# Patient Record
Sex: MALE | Race: BLACK OR AFRICAN AMERICAN
[De-identification: names, ages, dates, MRNs, and addresses within clinical notes are randomized per-mention and may not be internally consistent; named-entity substitution may affect disease eponyms.]

---

## 2017-03-24 ENCOUNTER — HOSPITAL ENCOUNTER (OUTPATIENT)
Dept: HOSPITAL 62 - RAD | Age: 62
End: 2017-03-24
Attending: INTERNAL MEDICINE
Payer: COMMERCIAL

## 2017-03-24 DIAGNOSIS — K74.69: Primary | ICD-10-CM

## 2017-03-24 PROCEDURE — 74177 CT ABD & PELVIS W/CONTRAST: CPT

## 2018-07-20 ENCOUNTER — HOSPITAL ENCOUNTER (EMERGENCY)
Dept: HOSPITAL 62 - ER | Age: 63
Discharge: HOME | End: 2018-07-20
Payer: COMMERCIAL

## 2018-07-20 VITALS — DIASTOLIC BLOOD PRESSURE: 70 MMHG | SYSTOLIC BLOOD PRESSURE: 150 MMHG

## 2018-07-20 DIAGNOSIS — I10: ICD-10-CM

## 2018-07-20 DIAGNOSIS — R06.02: Primary | ICD-10-CM

## 2018-07-20 DIAGNOSIS — D69.6: ICD-10-CM

## 2018-07-20 DIAGNOSIS — Z94.4: ICD-10-CM

## 2018-07-20 DIAGNOSIS — Z86.718: ICD-10-CM

## 2018-07-20 LAB
ADD MANUAL DIFF: NO
ANION GAP SERPL CALC-SCNC: 10 MMOL/L (ref 5–19)
BASOPHILS # BLD AUTO: 0 10^3/UL (ref 0–0.2)
BASOPHILS NFR BLD AUTO: 0.8 % (ref 0–2)
BUN SERPL-MCNC: 12 MG/DL (ref 7–20)
CALCIUM: 9.1 MG/DL (ref 8.4–10.2)
CHLORIDE SERPL-SCNC: 108 MMOL/L (ref 98–107)
CO2 SERPL-SCNC: 24 MMOL/L (ref 22–30)
EOSINOPHIL # BLD AUTO: 0 10^3/UL (ref 0–0.6)
EOSINOPHIL NFR BLD AUTO: 1 % (ref 0–6)
ERYTHROCYTE [DISTWIDTH] IN BLOOD BY AUTOMATED COUNT: 13.6 % (ref 11.5–14)
GLUCOSE SERPL-MCNC: 94 MG/DL (ref 75–110)
HCT VFR BLD CALC: 37.1 % (ref 37.9–51)
HGB BLD-MCNC: 12.5 G/DL (ref 13.5–17)
LYMPHOCYTES # BLD AUTO: 0.5 10^3/UL (ref 0.5–4.7)
LYMPHOCYTES NFR BLD AUTO: 23.6 % (ref 13–45)
MCH RBC QN AUTO: 28.3 PG (ref 27–33.4)
MCHC RBC AUTO-ENTMCNC: 33.6 G/DL (ref 32–36)
MCV RBC AUTO: 84 FL (ref 80–97)
MONOCYTES # BLD AUTO: 0.2 10^3/UL (ref 0.1–1.4)
MONOCYTES NFR BLD AUTO: 7.5 % (ref 3–13)
NEUTROPHILS # BLD AUTO: 1.6 10^3/UL (ref 1.7–8.2)
NEUTS SEG NFR BLD AUTO: 67.1 % (ref 42–78)
PLATELET # BLD: 81 10^3/UL (ref 150–450)
POTASSIUM SERPL-SCNC: 4.4 MMOL/L (ref 3.6–5)
RBC # BLD AUTO: 4.41 10^6/UL (ref 4.35–5.55)
SODIUM SERPL-SCNC: 142.1 MMOL/L (ref 137–145)
TOTAL CELLS COUNTED % (AUTO): 100 %
WBC # BLD AUTO: 2.3 10^3/UL (ref 4–10.5)

## 2018-07-20 PROCEDURE — 93010 ELECTROCARDIOGRAM REPORT: CPT

## 2018-07-20 PROCEDURE — 84484 ASSAY OF TROPONIN QUANT: CPT

## 2018-07-20 PROCEDURE — 80048 BASIC METABOLIC PNL TOTAL CA: CPT

## 2018-07-20 PROCEDURE — 99285 EMERGENCY DEPT VISIT HI MDM: CPT

## 2018-07-20 PROCEDURE — 93005 ELECTROCARDIOGRAM TRACING: CPT

## 2018-07-20 PROCEDURE — 85379 FIBRIN DEGRADATION QUANT: CPT

## 2018-07-20 PROCEDURE — 71046 X-RAY EXAM CHEST 2 VIEWS: CPT

## 2018-07-20 PROCEDURE — 36415 COLL VENOUS BLD VENIPUNCTURE: CPT

## 2018-07-20 PROCEDURE — 85025 COMPLETE CBC W/AUTO DIFF WBC: CPT

## 2018-07-20 NOTE — EKG REPORT
SEVERITY:- ABNORMAL ECG -

SINUS RHYTHM

FIRST DEGREE AV BLOCK

:

Confirmed by: Adrián Bean 20-Jul-2018 22:05:05

## 2018-07-20 NOTE — RADIOLOGY REPORT (SQ)
EXAM DESCRIPTION: 



XR CHEST 2 VIEWS



COMPLETED DATE/TME:  07/20/2018 06:05



CLINICAL HISTORY: 



62 years, Male, sob



COMPARISON:

None.



NUMBER OF VIEWS:

Two



TECHNIQUE:

Two views of the chest



LIMITATIONS:

None.



FINDINGS:



There is elevation of the right hemidiaphragm. The lungs are

clear. The heart is normal in size. There is no pneumothorax or

pleural effusion. There is no acute fracture



IMPRESSION:



No acute cardiopulmonary abnormality

 



 2011 [employer]- All Rights Reserved

## 2018-07-20 NOTE — ER DOCUMENT REPORT
ED Respiratory Problem





- General


Chief Complaint: Shortness Of Breath


Stated Complaint: SHORT OF BREATH


Time Seen by Provider: 07/20/18 07:19


Mode of Arrival: Ambulatory


Information source: Patient


Notes: 





Pt is a 62 year old male who presents to the ER today for shortness of breath 

that started last night. Pt works outside in the heat and has no history of 

asthma/copd, does not smoke. He does have a history of a blood clot to his 

right femoral vein from a liver transplant over a year ago, is not on blood 

thinners any longer. He denies chest pain with this sob, states it never hurt 

to take a deep breath and that the sob is gone now. He denies cough, fever, 

chills, wheezing or calf pain recently. 


TRAVEL OUTSIDE OF THE [place] IN LAST 30 DAYS: No





- Related Data


Allergies/Adverse Reactions: 


 





No Known Allergies Allergy (Unverified 03/11/15 13:29)


 











Past Medical History





- General


Information source: Patient





- Social History


Smoking Status: Never Smoker


Chew tobacco use (# tins/day): No


Frequency of alcohol use: None


Drug Abuse: None


Family History: Reviewed & Not Pertinent


Patient has suicidal ideation: No


Patient has homicidal ideation: No





- Past Medical History


Cardiac Medical History: Reports: Hx Hypertension


Renal/ Medical History: Denies: Hx Peritoneal Dialysis





- Immunizations


Hx Diphtheria, Pertussis, Tetanus Vaccination: Yes


Hx Pneumococcal Vaccination: 10/01/14





Review of Systems





- Review of Systems


Constitutional: No symptoms reported


EENT: No symptoms reported


Cardiovascular: No symptoms reported


Respiratory: See HPI


Gastrointestinal: No symptoms reported


Genitourinary: No symptoms reported


Male Genitourinary: No symptoms reported


Musculoskeletal: No symptoms reported


Skin: No symptoms reported


Hematologic/Lymphatic: No symptoms reported


Neurological/Psychological: No symptoms reported





Physical Exam





- Vital signs


Vitals: 


 











Temp Pulse Resp BP Pulse Ox


 


 98.4 F   65   26 H  121/78   95 


 


 07/20/18 05:17  07/20/18 05:17  07/20/18 05:17  07/20/18 05:17  07/20/18 05:17














- Notes


Notes: 


PHYSICAL EXAMINATION: 


GENERAL: well appearing and in no acute distress. 


HEAD: Atraumatic, normocephalic. 


EYES: Pupils equal round and reactive to light, extraocular movements intact, 

sclera anicteric, conjunctiva are normal. 


ENT: airway patent


NECK: Normal range of motion, supple without lymphadenopathy 


LUNGS: CTAB and equal. No wheezes rales or rhonchi. 


HEART: Regular rate and rhythm without murmurs


ABDOMEN: Soft, no tenderness. No guarding, no rebound 


BACK: no vertebral tenderness, normal ROM


GI/: no CVA tenderness


EXTREMITIES: Normal range of motion, no pitting edema. No cyanosis. 


NEUROLOGICAL: Cranial nerves grossly intact. Normal sensory/motor exams. 


PSYCH: Normal mood, normal affect. 


SKIN: Warm, Dry, normal turgor, no rashes or lesions noted 





Course





- Re-evaluation


Re-evalutation: 





07/20/18 23:26


Pt has unremarkable workup today, advised his platelet level is low at 81, his 

chest x ray is normal, d dimer is normal, but has normal vitals here. He has 

not been sob at all here in the ER. I will provide him with an inhaler rx and 

have him use that as needed. to follow up with his pcp. wife and pt in 

agreement with plan. 





- Vital Signs


Vital signs: 


 











Temp Pulse Resp BP Pulse Ox


 


 98 F   65   20   150/70 H  95 


 


 07/20/18 09:50  07/20/18 05:17  07/20/18 09:51  07/20/18 09:50  07/20/18 09:51














- Laboratory


Result Diagrams: 


 07/20/18 07:11





 07/20/18 07:11


Laboratory results interpreted by me: 


 











  07/20/18 07/20/18





  07:11 07:11


 


WBC  2.3 L 


 


Hgb  12.5 L 


 


Hct  37.1 L 


 


Plt Count  81 L 


 


Absolute Neutrophils  1.6 L 


 


Chloride   108 H














Discharge





- Discharge


Clinical Impression: 


 SOB (shortness of breath)





Condition: Stable


Disposition: HOME, SELF-CARE


Additional Instructions: 


Return immediately for any new or worsening symptoms.





Follow up with primary care provider, call tomorrow to make followup 

appointment.





We are giving you an inhaler today, please use it every 4 hours as needed for 

shortness of breath.  It does not appear like you have a blood clot today, your 

cardiac workup was normal, you are not having some type of injury to her heart 

today.  Your chest x-ray was normal today.  He did not have pneumonia.


Prescriptions: 


Albuterol Sulfate [Proair HFA Inhalation Aerosol 8.5 gm MDI] 2 puff IH Q4H PRN #

1 mdi


 PRN Reason: 


Forms:  Return to Work


Referrals: 


IVY TURNER MD [Primary Care Provider] - Follow up as needed